# Patient Record
Sex: FEMALE | Race: ASIAN | NOT HISPANIC OR LATINO | URBAN - METROPOLITAN AREA
[De-identification: names, ages, dates, MRNs, and addresses within clinical notes are randomized per-mention and may not be internally consistent; named-entity substitution may affect disease eponyms.]

---

## 2023-07-17 ENCOUNTER — OFFICE (OUTPATIENT)
Dept: URBAN - METROPOLITAN AREA CLINIC 19 | Facility: CLINIC | Age: 38
End: 2023-07-17

## 2023-07-17 VITALS
SYSTOLIC BLOOD PRESSURE: 110 MMHG | OXYGEN SATURATION: 100 % | WEIGHT: 131 LBS | DIASTOLIC BLOOD PRESSURE: 70 MMHG | HEIGHT: 67 IN | HEART RATE: 88 BPM

## 2023-07-17 DIAGNOSIS — R11.0 NAUSEA: ICD-10-CM

## 2023-07-17 PROCEDURE — 99203 OFFICE O/P NEW LOW 30 MIN: CPT

## 2023-07-17 NOTE — SERVICENOTES
The patient's assessment was reviewed with Dr. Bejarano and a collaborative plan of care was established.

## 2023-07-17 NOTE — SERVICEHPINOTES
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  Symptoms have been progressive over the last few weeks.  The nausea would occur 1st thing in the morning and resolve throughout the day.  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  She believes symptoms are likely due to this.  They worsened last week and she had some epigastric pain with it.  She was seen at a walk-in clinic and was given famotidine to start taking and advised to follow-up with GI.  Since starting the famotidine, her symptoms have significantly improved.  She does not take NSAIDs on a regular basis.  She denies significant reflux, heartburn, dysphagia, change in bowel habits or overt GI bleeding.  Family history is negative for colon neoplasm.